# Patient Record
Sex: FEMALE | Race: BLACK OR AFRICAN AMERICAN | NOT HISPANIC OR LATINO | URBAN - METROPOLITAN AREA
[De-identification: names, ages, dates, MRNs, and addresses within clinical notes are randomized per-mention and may not be internally consistent; named-entity substitution may affect disease eponyms.]

---

## 2017-01-28 ENCOUNTER — TRANSFERRED RECORDS (OUTPATIENT)
Dept: HEALTH INFORMATION MANAGEMENT | Facility: CLINIC | Age: 22
End: 2017-01-28

## 2017-11-12 ENCOUNTER — HEALTH MAINTENANCE LETTER (OUTPATIENT)
Age: 22
End: 2017-11-12

## 2018-05-28 ENCOUNTER — HEALTH MAINTENANCE LETTER (OUTPATIENT)
Age: 23
End: 2018-05-28

## 2019-02-20 ENCOUNTER — OFFICE VISIT (OUTPATIENT)
Dept: OPTOMETRY | Facility: CLINIC | Age: 24
End: 2019-02-20
Payer: COMMERCIAL

## 2019-02-20 ENCOUNTER — APPOINTMENT (OUTPATIENT)
Dept: OPTOMETRY | Facility: CLINIC | Age: 24
End: 2019-02-20
Payer: COMMERCIAL

## 2019-02-20 DIAGNOSIS — H52.13 MYOPIA OF BOTH EYES: ICD-10-CM

## 2019-02-20 DIAGNOSIS — Z01.00 ENCOUNTER FOR EXAMINATION OF EYES AND VISION WITHOUT ABNORMAL FINDINGS: Primary | ICD-10-CM

## 2019-02-20 PROCEDURE — 92015 DETERMINE REFRACTIVE STATE: CPT | Performed by: OPTOMETRIST

## 2019-02-20 PROCEDURE — 92004 COMPRE OPH EXAM NEW PT 1/>: CPT | Performed by: OPTOMETRIST

## 2019-02-20 ASSESSMENT — SLIT LAMP EXAM - LIDS
COMMENTS: NORMAL
COMMENTS: NORMAL

## 2019-02-20 ASSESSMENT — REFRACTION_MANIFEST
OD_CYLINDER: SPHERE
OS_SPHERE: -1.50
OD_SPHERE: -1.50
OS_CYLINDER: SPHERE

## 2019-02-20 ASSESSMENT — EXTERNAL EXAM - RIGHT EYE: OD_EXAM: NORMAL

## 2019-02-20 ASSESSMENT — REFRACTION_WEARINGRX
OS_CYLINDER: SPHERE
OS_SPHERE: -1.25
OD_CYLINDER: SPHERE
OD_SPHERE: -1.50

## 2019-02-20 ASSESSMENT — VISUAL ACUITY
OS_SC: 20/20 -1
OD_SC: 20/150
OS_SC: 20/125
METHOD: SNELLEN - LINEAR
OD_SC: 20/20 -1

## 2019-02-20 ASSESSMENT — CUP TO DISC RATIO
OS_RATIO: 0.15
OD_RATIO: 0.2

## 2019-02-20 ASSESSMENT — TONOMETRY
OS_IOP_MMHG: 16
IOP_METHOD: APPLANATION
OD_IOP_MMHG: 15

## 2019-02-20 ASSESSMENT — CONF VISUAL FIELD
OD_NORMAL: 1
OS_NORMAL: 1

## 2019-02-20 ASSESSMENT — EXTERNAL EXAM - LEFT EYE: OS_EXAM: NORMAL

## 2019-02-20 NOTE — PROGRESS NOTES
Chief Complaint   Patient presents with     Annual Eye Exam      Accompanied by self  Last Eye Exam: 4 years ago  Dilated Previously: Yes    What are you currently using to see?  Glasses-broke       Distance Vision Acuity: Noticed gradual change in both eyes    Near Vision Acuity: Satisfied with vision while reading  unaided    Eye Comfort: good  Do you use eye drops? : No  Occupation or Hobbies:     Violet Hendricks, Optometric Tech          Medical, surgical and family histories reviewed and updated 2/20/2019.       OBJECTIVE: See Ophthalmology exam    ASSESSMENT:    ICD-10-CM    1. Encounter for examination of eyes and vision without abnormal findings Z01.00    2. Myopia of both eyes H52.13       PLAN:     Patient Instructions   Joleen was advised of today's exam findings.  Fill glasses prescription  Allow 2 weeks to adapt to change in glasses  Wear glasses full time  Copy of glasses Rx provided today.  Return in 1-2 years for eye exam, or sooner if needed.    The affects of the dilating drops last for 4- 6 hours.  You will be more sensitive to light and vision will be blurry up close.  Mydriatic sunglasses were given if needed.      Victor Manuel Birmingham O.D.  [address]. [facility] [place]  [address]    [phone]

## 2019-02-20 NOTE — PATIENT INSTRUCTIONS
Joleen was advised of today's exam findings.  Fill glasses prescription  Allow 2 weeks to adapt to change in glasses  Wear glasses full time  Copy of glasses Rx provided today.  Return in 1-2 years for eye exam, or sooner if needed.    The affects of the dilating drops last for 4- 6 hours.  You will be more sensitive to light and vision will be blurry up close.  Mydriatic sunglasses were given if needed.      Victor Manuel Birmingham O.D.  [facility] [place]  [address] [place] [address]. [place]  [place]  [address]    [phone]

## 2019-02-20 NOTE — LETTER
2/20/2019         RE: Joleen Morris  [address]  [place] [address]        Dear Colleague,    Thank you for referring your patient, Joleen Morris, to the [facility]. Please see a copy of my visit note below.    Chief Complaint   Patient presents with     Annual Eye Exam      Accompanied by self  Last Eye Exam: 4 years ago  Dilated Previously: Yes    What are you currently using to see?  Glasses-broke       Distance Vision Acuity: Noticed gradual change in both eyes    Near Vision Acuity: Satisfied with vision while reading  unaided    Eye Comfort: good  Do you use eye drops? : No  Occupation or Hobbies:     Violet Hendricks, Optometric Tech          Medical, surgical and family histories reviewed and updated 2/20/2019.       OBJECTIVE: See Ophthalmology exam    ASSESSMENT:    ICD-10-CM    1. Encounter for examination of eyes and vision without abnormal findings Z01.00    2. Myopia of both eyes H52.13       PLAN:     Patient Instructions   Joleen was advised of today's exam findings.  Fill glasses prescription  Allow 2 weeks to adapt to change in glasses  Wear glasses full time  Copy of glasses Rx provided today.  Return in 1-2 years for eye exam, or sooner if needed.    The affects of the dilating drops last for 4- 6 hours.  You will be more sensitive to light and vision will be blurry up close.  Mydriatic sunglasses were given if needed.      Victor Manuel Birmingham O.D.  [address]  [place]  [address]    [phone]           Again, thank you for allowing me to participate in the care of your patient.        Sincerely,        Victor Manuel Birmingham OD

## 2019-02-26 ENCOUNTER — APPOINTMENT (OUTPATIENT)
Dept: OPTOMETRY | Facility: CLINIC | Age: 24
End: 2019-02-26
Payer: COMMERCIAL

## 2019-02-26 PROCEDURE — 92340 FIT SPECTACLES MONOFOCAL: CPT | Performed by: OPTOMETRIST

## 2023-12-05 ENCOUNTER — TELEPHONE (OUTPATIENT)
Dept: FAMILY MEDICINE | Facility: CLINIC | Age: 28
End: 2023-12-05

## 2023-12-05 ENCOUNTER — OFFICE VISIT (OUTPATIENT)
Dept: FAMILY MEDICINE | Facility: CLINIC | Age: 28
End: 2023-12-05
Payer: COMMERCIAL

## 2023-12-05 VITALS
SYSTOLIC BLOOD PRESSURE: 136 MMHG | HEART RATE: 96 BPM | OXYGEN SATURATION: 98 % | BODY MASS INDEX: 43.4 KG/M2 | WEIGHT: 293 LBS | RESPIRATION RATE: 20 BRPM | TEMPERATURE: 98.9 F | HEIGHT: 69 IN | DIASTOLIC BLOOD PRESSURE: 81 MMHG

## 2023-12-05 DIAGNOSIS — Z59.00 HOMELESSNESS: ICD-10-CM

## 2023-12-05 DIAGNOSIS — Z13.9 ENCOUNTER FOR SCREENING INVOLVING SOCIAL DETERMINANTS OF HEALTH (SDOH): ICD-10-CM

## 2023-12-05 DIAGNOSIS — F33.1 MODERATE EPISODE OF RECURRENT MAJOR DEPRESSIVE DISORDER (H): ICD-10-CM

## 2023-12-05 DIAGNOSIS — F41.1 GAD (GENERALIZED ANXIETY DISORDER): ICD-10-CM

## 2023-12-05 PROCEDURE — 99204 OFFICE O/P NEW MOD 45 MIN: CPT | Performed by: NURSE PRACTITIONER

## 2023-12-05 PROCEDURE — 36415 COLL VENOUS BLD VENIPUNCTURE: CPT | Performed by: NURSE PRACTITIONER

## 2023-12-05 PROCEDURE — 84443 ASSAY THYROID STIM HORMONE: CPT | Performed by: NURSE PRACTITIONER

## 2023-12-05 PROCEDURE — 80053 COMPREHEN METABOLIC PANEL: CPT | Performed by: NURSE PRACTITIONER

## 2023-12-05 RX ORDER — OXYCODONE HYDROCHLORIDE 5 MG/1
5 TABLET ORAL
COMMUNITY
Start: 2023-12-05 | End: 2023-12-26

## 2023-12-05 RX ORDER — HYDROXYZINE HYDROCHLORIDE 10 MG/1
10 TABLET, FILM COATED ORAL 3 TIMES DAILY PRN
Qty: 90 TABLET | Refills: 0 | Status: SHIPPED | OUTPATIENT
Start: 2023-12-05

## 2023-12-05 RX ORDER — DOXYCYCLINE 100 MG/1
100 CAPSULE ORAL
COMMUNITY
Start: 2023-12-05 | End: 2023-12-12

## 2023-12-05 RX ORDER — FLUOXETINE 10 MG/1
10 CAPSULE ORAL DAILY
Qty: 90 CAPSULE | Refills: 0 | Status: SHIPPED | OUTPATIENT
Start: 2023-12-05 | End: 2023-12-26

## 2023-12-05 RX ORDER — ACETAMINOPHEN 500 MG
1000 TABLET ORAL EVERY 6 HOURS
COMMUNITY
Start: 2023-12-05 | End: 2023-12-26

## 2023-12-05 RX ORDER — IBUPROFEN 600 MG/1
600 TABLET, FILM COATED ORAL
COMMUNITY
Start: 2023-11-01 | End: 2023-12-26

## 2023-12-05 SDOH — ECONOMIC STABILITY - HOUSING INSECURITY: HOMELESSNESS UNSPECIFIED: Z59.00

## 2023-12-05 ASSESSMENT — ANXIETY QUESTIONNAIRES
6. BECOMING EASILY ANNOYED OR IRRITABLE: NEARLY EVERY DAY
GAD7 TOTAL SCORE: 16
5. BEING SO RESTLESS THAT IT IS HARD TO SIT STILL: SEVERAL DAYS
1. FEELING NERVOUS, ANXIOUS, OR ON EDGE: NEARLY EVERY DAY
2. NOT BEING ABLE TO STOP OR CONTROL WORRYING: NEARLY EVERY DAY
3. WORRYING TOO MUCH ABOUT DIFFERENT THINGS: NEARLY EVERY DAY
7. FEELING AFRAID AS IF SOMETHING AWFUL MIGHT HAPPEN: SEVERAL DAYS
GAD7 TOTAL SCORE: 16
IF YOU CHECKED OFF ANY PROBLEMS ON THIS QUESTIONNAIRE, HOW DIFFICULT HAVE THESE PROBLEMS MADE IT FOR YOU TO DO YOUR WORK, TAKE CARE OF THINGS AT HOME, OR GET ALONG WITH OTHER PEOPLE: VERY DIFFICULT

## 2023-12-05 ASSESSMENT — PATIENT HEALTH QUESTIONNAIRE - PHQ9
SUM OF ALL RESPONSES TO PHQ QUESTIONS 1-9: 22
5. POOR APPETITE OR OVEREATING: MORE THAN HALF THE DAYS
10. IF YOU CHECKED OFF ANY PROBLEMS, HOW DIFFICULT HAVE THESE PROBLEMS MADE IT FOR YOU TO DO YOUR WORK, TAKE CARE OF THINGS AT HOME, OR GET ALONG WITH OTHER PEOPLE: VERY DIFFICULT
SUM OF ALL RESPONSES TO PHQ QUESTIONS 1-9: 22

## 2023-12-05 NOTE — TELEPHONE ENCOUNTER
Patient gave writer form for provider to complete. Patient states that we can fax it out once it is completed.

## 2023-12-05 NOTE — COMMUNITY RESOURCES LIST (ENGLISH)
12/05/2023   [facility] - Outpatient Clinics  N/A  For additional resource needs, please contact your health insurance member services or your primary care team.  Phone: [phone]   Email: N/A   Address: [facility] [place] [address]   Hours: N/A        Financial Stability       Utility payment assistance  1  [place] [facility] - Energy and Utilities Distance: 3.53 miles      In-Person, Phone/Virtual   [address] [place] [address]  Language: English  Hours: Mon - Fri 8:30 AM - 4:30 PM  Fees: Free   Phone: (726) 640-8842 Website: https://[place].[facility]/RF nano/energy/consumer-assistance/energy-assistance-program/     2  [place] Public Prizm Payment Services Commission - [place]'s Telephone Assistance Plan (TAP) and Federal Lifeline and Affordable Connectivity Program (ACP) Distance: 9.88 miles      Phone/Virtual   [address] [place] [address]  Language: English  Fees: Free   Phone: (480) 420-4444 Email: consumer.puc@[facility].mn. Website: https://[place].gov/puc/consumers/telephone/          Food and Nutrition       Food pantry  3  [facility] Distance: 0.52 miles      Pickup   [address] [facility] [place] [address]  Language: English, Mauritian, Vincentian  Hours: Mon - Fri 12:00 PM - 1:00 PM  Fees: Free   Phone: (545) 927-5497 Email: info@[facility].org Website: https://www.[facility].org/locations/[facility]_[place]_ymca     4  Rona Oneal of Peace - Emergency Food Shelf Distance: 0.53 miles      Pickup   [address] [place] [address]  Language: English, Vincentian  Hours: Mon 9:30 AM - 11:30 AM Appt. Only  Fees: Free   Phone: (281) 989-6367 Email: rona@Oriental-Creations.org Website: http://www.Oriental-Creations.org/     SNAP application assistance  5  [employer] [place] Distance: 2.95 miles      Phone/Virtual   [address] [place] [address]  Language: English, Hmong, Scottish, Mauritian, Vincentian  Hours: Mon - Fri 8:30 AM - 4:30 PM   Fees: Free   Phone: (582) 486-2649 Email: helpline@hungersolutions.org Website: https://www.hungersolutions.org/programs/[place]-food-helpline/     6  Community Action Partnership ([facility] Distance: 0.56 miles      Phone/Virtual   [address] [place] [address]  Language: English  Hours: Mon - Fri 8:00 AM - 4:30 PM  Fees: Free   Phone: (550) 826-3495 Email: info@caprw.org Website: http://www.caprw.org/     Soup kitchen or free meals  7  [employer] [place] Distance: 0.27 miles      Pickup   [address] [place] [address]  Language: English  Hours: Mon 12:00 PM - 2:00 PM , Wed 12:00 PM - 2:00 PM  Fees: Free   Phone: (564) 303-4847 Ext.4 Email: info@International Battery.Smallaa Website: http://www.International Battery.Smallaa     8  [facility] [place] - [facility] - Free Summer Meals Distance: 0.54 miles      In-Person   [address] [place] [address]  Language: English  Hours: Mon - Fri 12:30 PM - 1:30 PM , Mon - Fri 4:00 PM - 5:00 PM  Fees: Free   Phone: (751) 371-7439 Email: gabe@.hospitals.mn. Website: https://www.hospitals.[facility]/facilities/riubnuu-zborgzpdbp-exdbnw          Hotlines and Helplines       Hotline - Housing crisis  9  [facility] Distance: 4.78 miles      Phone/Virtual   [address] [place] [address]  Language: English  Hours: Mon - Sun Open 24 Hours   Phone: (751) 945-4477 Email: communications@oscs-mn.org Website: https://oscs-[place].org/oursaviourshousing/     10  [facility] Distance: 6.37 miles      Phone/Virtual   [address] [place] [address]  Language: English  Hours: Mon - Sun Open 24 Hours   Phone: (119) 718-8992 Email: info@[facility].org Website: http://www.Act[facility].org          Housing       Coordinated Entry access point  11  [facility] - Coordinated Access to Housing and Shelter (CAHS) - Coordinated Access - Coordinated Entry access point Distance: 0.56  miles      In-Person, Phone/Virtual   [address] [place] [address]  Language: English  Hours: Mon - Fri 8:00 AM - 4:30 PM  Fees: Free   Phone: (497) 240-3383 Website: https://www.[facility]./residents/assistance-support/assistance/housing-services-support     12  [facility] Distance: 3.15 miles      In-Person, Phone/Virtual   [address] [place] [address]  Language: English  Hours: Mon - Fri 8:30 AM - 4:30 PM  Fees: Free   Phone: (355) 982-3904 Email: info@[facility] Borg[facility] HospitalMediasurface Website: https://www.[facility]ension Borgess HospitalPathfinder Technologiesorg/locations/[facility]/     Drop-in center or day shelter  13  [facility] Distance: 4.4 miles      In-Person   [address] [place] [address]  Language: English  Hours: Mon - Fri 9:00 AM - 4:00 PM  Fees: Free   Phone: (555) 428-8367 Email: frontjenniferCloudTags@Neocis Website: http://Neocis     14  [facility] Center Distance: 4.85 miles      In-Person   [address] [place] [address]  Language: English, Solomon Islander, South African  Hours: Mon - Sat 7:00 AM - 3:00 PM  Fees: Free, Self Pay   Phone: (538) 988-5568 Email: info@OwnLocal Website: https://www.OwnLocal/locations/opportunity-center/     Housing search assistance  15  [employer] Online - https://New Futuro/ Distance: 5.12 miles      Phone/Virtual   [address] [place] [address]  Language: English  Hours: Mon - Sun Open 24 Hours   Email: info@Greendizer Website: https://Polatis.Group-IB     16  VinopolisLink - Online housing search assistance Distance: 6.27 miles      Phone/Virtual   [address] [address]  Language: English, Hmong, Solomon Islander, South African  Hours: Mon - Sun Open 24 Hours   Phone: (462) 855-2249 Email: info@Aridhia Informatics.org Website: http://www.Mobiolink.org/     Shelter for families  17  [facility] Distance:  16.18 miles      In-Person   [address] [place] [address]  Language: English  Hours: Mon - Fri 3:00 PM - 9:00 AM , Sat - Sun Open 24 Hours  Fees: Free   Phone: (747) 167-6131 Ext.1 Website: https://www.saintandrews.Globili/2020/07/03/emergency-family-shelter/     Shelter for individuals  18  [facility] - Coordinated Access to Housing and Shelter (CAHS) - Coordinated Access - Emergency housing Distance: 0.56 miles      In-Person, Phone/Virtual   [address] [place] [address]  Language: English  Hours: Mon - Fri 8:00 AM - 4:30 PM  Fees: Free   Phone: (548) 382-8677 Website: https://www.[facility]./residents/assistance-support/assistance/housing-services-support     19  [facility] [place] Shelter - [facility] [place] Shelter Distance: 3.1 miles      In-Person   [address] [place] [address]  Language: English  Hours: Mon - Sun 5:00 PM - 10:00 AM  Fees: Free, Self Pay   Phone: (625) 791-6859 Email: info@Kleo Website: https://www.byUs.org/locations/[facility]-saint-paul/          Transportation       Free or low-cost transportation  20  [facility] Distance: 1.66 miles      In-Person   [address] [place] [address]  Language: English, Portuguese  Hours: Mon 9:00 AM - 5:00 PM , Tue 9:30 AM - 7:00 PM , Wed 9:00 AM - 5:00 PM , Thu 9:30 AM - 7:00 PM , Fri 9:00 AM - 5:00 PM  Fees: Free   Phone: (323) 886-8298 Email: odilon@wooju Website: http://www.wooju     21  [facility] Distance: 4.84 miles      In-Person   [address] [place] [address]  Language: English  Hours: Mon - Fri 8:00 AM - 3:00 PM  Fees: Free   Phone: (923) 968-8516 Ext.14 Email: cherelle@[facility] Website: http://www.[facility].org     Transportation to medical appointments  22  [facility] - Non-Emergency Medical Transportation  Distance: 2.99 miles      In-Person   [address] [place] [address]  Language: English  Hours: Mon - Fri 8:00 AM - 4:00 PM Appt. Only  Fees: Self Pay   Phone: (458) 110-4050 Website: http://www.allAtmocean.org/Medical-Services/Emergency-medical-services/Non-emergency-transportation/     23  Miguelina Ride Distance: 4.7 miles      In-Person   [address] [address]  Language: English  Hours: Mon - Thu 6:00 AM - 6:00 PM , Fri 6:00 AM - 5:00 PM  Fees: Insurance, Self Pay   Phone: (856) 795-6351 Email: office@Fortscale Website: https://www.Fortscale/          Important Numbers & Websites       [address].org  Poison Control   (879) 553-7060 [place]poison.org  Suicide and Crisis Lifeline   [address] [place], [place] [address]line.org  Childhelp [place] Child Abuse Hotline   760.903.5877 Childhelphotline.org  [place] Sexual Assault Hotline   (805) 245-6929 (HOPE) [facility]  National Runaway Safeline   (578) 281-6211 (RUNAWAY) [facility] Hospital[facility]away.org  Pregnancy & Postpartum Support [place]   Call/text 936-131-3560 Ppsupportmn.org  Substance Abuse National Helpline ([facility]   246-737-HELP (8101) Findtreatment.gov  Emergency Services   911

## 2023-12-05 NOTE — COMMUNITY RESOURCES LIST (ENGLISH)
12/05/2023   [facility]  N/A  For questions about this resource list or additional care needs, please contact your primary care clinic or care manager.  Phone: [phone]   Email: N/A   Address: [address] [place] [address]   Hours: N/A        Financial Stability       Utility payment assistance  1  Community Action Partnership (CAP) [facility] - Energy Assistance Distance: 0.56 miles      Phone/Virtual   [address] [place] [address]  Language: English, Hmong, Paraguayan, Slovenian  Hours: Mon - Fri 8:00 AM - 4:30 PM  Fees: Free   Phone: (560) 247-2057 Email: info@caprw.org Website: http://www.capr.org/     2  [facility] Distance: 2.93 miles      Phone/Virtual   [address] [place] [address]  Language: English, Hmong, Jordanian, Slovenian  Hours: Mon - Fri 10:00 AM - 3:00 PM  Fees: Free   Phone: (405) 580-2259 Email: Hailey@[facility].org Website: http://[facility].org/community/[address]/          Food and Nutrition       Food pantry  3  [facility] Distance: 0.52 miles      Pickup   [address] [place] [address]  Language: English, Paraguayan, Slovenian  Hours: Mon - Fri 12:00 PM - 1:00 PM  Fees: Free   Phone: (585) 472-9290 Email: info@Cyclone Power Technologies.org Website: https://www.[facility].org/locations/_Bonita_Demorest_ymca     4  Rona Mcdowellers of Peace - Emergency Food Shelf Distance: 0.53 miles      Pickup   [address] [place] [address]  Language: English, Slovenian  Hours: Mon 9:30 AM - 11:30 AM Appt. Only  Fees: Free   Phone: (467) 115-9444 Email: rona@Combined Power.org Website: http://www.Combined Power.org/     SNAP application assistance  5  Community Action Partnership (CAP)  [facility] Distance: 0.56 miles      Phone/Virtual   [address] [facility] [address] [place] [address]  Language:  English  Hours: Mon - Fri 8:00 AM - 4:30 PM  Fees: Free   Phone: (822) 605-8324 Email: info@Returbo.org Website: http://www.Returbo.org/     6  [facility] Extension - SNAP Ed - SNAP Ed - SNAP application assistance Distance: 2.09 miles      In-Person   [address] [place] [address]  Language: English, Hmong, Oromo, Monegasque, Thai  Hours: Mon - Fri 9:00 AM - 5:00 PM Appt. Only  Fees: Free   Phone: (820) 963-4488 Email: jamir@[facility] Website: https://extension.[facility]/nutrition-education/snap-ed-educational-offerings     Soup kitchen or free meals  7  [employer] Hands [place] Distance: 0.27 miles      Pickup   [address] [place] [address]  Language: English  Hours: Mon 12:00 PM - 2:00 PM , Wed 12:00 PM - 2:00 PM  Fees: Free   Phone: (756) 359-9209 Ext.4 Email: info@THE ICONIC.Ruby Groupe Website: http://www.THE ICONIC.Ruby Groupe     8  [facility] [place] - General acute hospital - Free Summer Meals Distance: 0.54 miles      In-Person   [address] [place] [address]  Language: English  Hours: Mon - Fri 12:30 PM - 1:30 PM , Mon - Fri 4:00 PM - 5:00 PM  Fees: Free   Phone: (869) 168-1928 Email: gabe@.hospitals.mn. Website: https://www.hospitals.[facility]/facilities/jcnifkk-qofjvytrmn-vmgxie          Hotlines and Helplines       Hotline - Housing crisis  9  [employer] Housing Distance: 4.78 miles      Phone/Virtual   [address] [place] [address]  Language: English  Hours: Mon - Sun Open 24 Hours   Phone: (986) 346-9661 Email: communications@oscs-mn.org Website: https://oscs-mn.org/oursaviourshousing/     10  [facility] Distance: 6.37 miles      Phone/Virtual   [address] [place] [address]  Language: English  Hours: Mon - Sun Open 24 Hours   Phone: (109) 727-5951 Email: info@[facility]avenna Solutions[facility].Ruby Groupe Website: http://www.Farallon BiosciencesPar[facility].org          Housing       Coordinated Entry access point  11  [facility] - Coordinated Access to  Housing and Shelter (CAHS) - Coordinated Access - Coordinated Entry access point Distance: 0.56 miles      In-Person, Phone/Virtual   [address] [place] [address]  Language: English  Hours: Mon - Fri 8:00 AM - 4:30 PM  Fees: Free   Phone: (323) 109-1739 Website: https://www.LightSand CommunicationsMcAlester Regional Health Center – McAlesterBeat My Waste Quote./residents/assistance-support/assistance/housing-services-support     12  [facility] [place] Distance: 3.15 miles      In-Person, Phone/Virtual   [address] [place] [address]  Language: English  Hours: Mon - Fri 8:30 AM - 4:30 PM  Fees: Free   Phone: (936) 315-7204 Email: info@Phosphagenics Website: https://www.Netminingorg/locations/[facility]/     Drop-in center or day shelter  13  [facility] Distance: 4.4 miles      In-Person   [address] [place] [address]  Language: English  Hours: Mon - Fri 9:00 AM - 4:00 PM  Fees: Free   Phone: (123) 494-1045 Email: shantell@VMware Website: http://VMware     14  [facility] and [place] [facility] Distance: 4.85 miles      In-Person   [address] [place] [address]  Language: English, Kenyan, Kinyarwanda  Hours: Mon - Sat 7:00 AM - 3:00 PM  Fees: Free, Self Pay   Phone: (846) 760-4686 Email: info@Blyk.Nightpro Website: https://www.Blyk.org/locations/opportunity-center/     Housing search assistance  15  [facility] - Mental Health Crisis Housing Search Assistance Distance: 0.79 miles      In-Person, Phone/Virtual   [address] [place] [address]  Language: English  Hours: Mon - Tue 8:00 AM - 4:30 PM , Wed 8:00 AM - 6:00 PM , Thu - Fri 8:00 AM - 4:30 PM  Fees: Free   Phone: (757) 357-7621 Email: [facility] Campusc@co.[facility]. Website: https://www.[facility]./residents/health-medical/clinics-services/mental-health/adult-mental-health     16  [facility] - Online Network Search  Assistance Distance: 3.26 miles      Phone/Virtual   [address] [place] [address]  Language: English  Hours: Mon - Sun Open 24 Hours  Fees: Free   Phone: (430) 626-3762 Email: sorayanainaenrico@Burbio.com Website: https://Burbio.com/     Shelter for families  17  [facility] Distance: 16.18 miles      In-Person   [address] [facility] [place] [address]  Language: English  Hours: Mon - Fri 3:00 PM - 9:00 AM , Sat - Sun Open 24 Hours  Fees: Free   Phone: (877) 248-7327 Ext.1 Website: https://www.saintandrews.Microbank Software/2020/07/03/emergency-family-shelter/     Shelter for individuals  18  [facility] - Coordinated Access to Housing and Shelter ([facility]) - Coordinated Access - Emergency housing Distance: 0.56 miles      In-Person, Phone/Virtual   [address] [place] [address]  Language: English  Hours: Mon - Fri 8:00 AM - 4:30 PM  Fees: Free   Phone: (282) 520-1561 Website: https://www.[facility]./residents/assistance-support/assistance/housing-services-support     19  [facility] [place] Shelter - [facility] [place] Shelter Distance: 3.1 miles      In-Person   [address] [place] [address]  Language: English  Hours: Mon - Sun 5:00 PM - 10:00 AM  Fees: Free, Self Pay   Phone: (119) 803-6630 Email: info@[facility] Medical.Microbank Software Website: https://www.Photosonix Medical.org/locations/[facility]-saint-paul/          Transportation       Free or low-cost transportation  20  [employer] Distance: 1.66 miles      In-Person   [address] [place] [address]  Language: English, Turks and Caicos Islander  Hours: Mon 9:00 AM - 5:00 PM , Tue 9:30 AM - 7:00 PM , Wed 9:00 AM - 5:00 PM , Thu 9:30 AM - 7:00 PM , Fri 9:00 AM - 5:00 PM  Fees: Free   Phone: (528) 841-1279 Email: contactus@SynGen.org Website: http://www.SynGen.org     21  [facility] Distance: 4.84 miles      In-Person   [address] [place] [address]  Language: English  Hours: Mon - Fri 8:00 AM - 3:00 PM  Fees: Free   Phone: (946) 611-8754 Ext.14 Email: cherelle@[facility] Website: http://www.SiteBrandMercy Hospital BakersfieldSeeoKettering Health Greene MemorialJaguar Animal Health     Transportation to medical appointments  22  [facility] Transportation - Non-Emergency Medical Transportation Distance: 2.99 miles      In-Person   [address] [place] [address]  Language: English  Hours: Mon - Fri 8:00 AM - 4:00 PM Appt. Only  Fees: Self Pay   Phone: (991) 221-2248 Website: http://www.Ioxus.org/Medical-Services/Emergency-medical-services/Non-emergency-transportation/     23  [facility] Ride Distance: 4.7 miles      In-Person   [address] [address]  Language: English  Hours: Mon - Thu 6:00 AM - 6:00 PM , Fri 6:00 AM - 5:00 PM  Fees: Insurance, Self Pay   Phone: (657) 312-5736 Email: office@Radar Networks Website: https://www.Radar Networks/          Important Numbers & Websites       Emergency Services   911  City Services   311  Poison Control   (552) 331-5235  Suicide Prevention Lifeline   (164) 561-7482 (TALK)  Child Abuse Hotline   (391) 127-1434 (4-A-Child)  Sexual Assault Hotline   (684) 218-4724 (HOPE)  National Runaway Safeline   (539) 883-8220 (RUNAWAY)  All-Options Talkline   (509) 439-8916  Substance Abuse Referral   (913) 754-3016 (HELP)
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
## 2023-12-05 NOTE — PROGRESS NOTES
"  Assessment & Plan     Moderate episode of recurrent major depressive disorder (H)  - Adult Mental Health  Referral  - FLUoxetine (PROZAC) 10 MG capsule  Dispense: 90 capsule; Refill: 0  - TSH with free T4 reflex  - Comprehensive metabolic panel (BMP + Alb, Alk Phos, ALT, AST, Total. Bili, TP)  - TSH with free T4 reflex  - Comprehensive metabolic panel (BMP + Alb, Alk Phos, ALT, AST, Total. Bili, TP)  No thoughts or plan to hurt herself or others. Will start prozac 10mg daily, if tolerating well and no side effects, increase dosage to prozac 20mg daily in 2 weeks. Follow up in 2-4 weeks  Common side effects of Prozac and black box warning was reviewed.   Depression action plan was sent via [employer]    SONIA (generalized anxiety disorder)  As above   - hydrOXYzine HCl (ATARAX) 10 MG tablet  Dispense: 90 tablet; Refill: 0    Encounter for screening involving social determinants of health (SDoH)  - Primary Care - Care Coordination Referral    Homelessness  In the process of applying for housing - will fill out form for professional statement of need and fax it when completed.                Nicotine/Tobacco Cessation:  She reports that she has been smoking cigarettes. She has a 5.00 pack-year smoking history. She has never used smokeless tobacco.  Nicotine/Tobacco Cessation Plan:   Information offered: Patient not interested at this time      BMI:   Estimated body mass index is 47.05 kg/m  as calculated from the following:    Height as of this encounter: 1.746 m (5' 8.75\").    Weight as of this encounter: 143.5 kg (316 lb 4.8 oz).   Weight management plan: Discussed healthy diet and exercise guidelines    Depression Screening Follow Up        12/5/2023    12:34 PM   PHQ   PHQ-9 Total Score 22   Q9: Thoughts of better off dead/self-harm past 2 weeks More than half the days   F/U: Thoughts of suicide or self-harm No   F/U: Safety concerns No         12/5/2023    12:34 PM   Last PHQ-9   1.  Little interest or " pleasure in doing things 2   2.  Feeling down, depressed, or hopeless 3   3.  Trouble falling or staying asleep, or sleeping too much 3   4.  Feeling tired or having little energy 2   5.  Poor appetite or overeating 3   6.  Feeling bad about yourself 3   7.  Trouble concentrating 3   8.  Moving slowly or restless 1   Q9: Thoughts of better off dead/self-harm past 2 weeks 2   PHQ-9 Total Score 22   In the past two weeks have you had thoughts of suicide or self harm? No   Do you have concerns about your personal safety or the safety of others? No               Follow Up      Follow Up Actions Taken  Mental Health Referral placed    Discussed the following ways the patient can remain in a safe environment:  be around others -    ARELIS Allison CNP  [facility]    Karly Goodrich is a 28 year old, with past medical history of obesity, depression and anxiety, presenting for the following health issues:  Mental Health    Joleen is a new patient to me.  Joleen presents needing forms to be filled out as she is applying for hosing. Repots underlying depression ongoing for many years but had not received prior medical care related to this condition as she did not address it.   Currently, does not have a permanent home. Staying with sister or sleeping in her car. Works overnight.  Working full time. 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  Has anxiety about working around people and working overnights has been a good strategy to manage anxiety. Work is good.  Work keeps her occupied and motivated  Tried to apply for an apartment, but could not qualify because she was living with parents in the past (dad passed away, and Mom moved on her own) -parents had a bad record and she was living in same address which has affected her record.   Denies drug use. Denies drinking alcohol.  Smokes: 1 pack every 1-3 days. Started smoking since age 19.        12/5/2023     3:21 PM   Additional  "Questions   Roomed by Janet CALDERON CMA   Accompanied by N/A       History of Present Illness       Reason for visit:  Mental health    She eats 0-1 servings of fruits and vegetables daily.She consumes 7 sweetened beverage(s) daily.She exercises with enough effort to increase her heart rate 9 or less minutes per day.  She exercises with enough effort to increase her heart rate 3 or less days per week.   She is not taking prescribed medications regularly due to remembering to take.         Abnormal Mood Symptoms  Onset/Duration: patient states long over due   Description: having episodes at work or driving as well  Depression (if yes, do PHQ-9): YES  Anxiety (if yes, do SONIA-7): YES  Accompanying Signs & Symptoms:  Still participating in activities that you used to enjoy: No  Fatigue: YES  Irritability: YES  Difficulty concentrating: YES  Changes in appetite: N/A  Problems with sleep: YES not getting enough rest or else sleeping a lot  Heart racing/beating fast: YES sometimes that will make me panic  Abnormally elevated, expansive, or irritable mood: YES  Persistently increased activity or energy: No, there are some random bursts of energy though  Thoughts of hurting yourself or others: not of hurting myself but there are times where I think \"I don't want to do this anymore\"  History:  Recent stress or major life event: YES, homeless on and off for a long time  Prior depression or anxiety: yes but was never diagnosed  Family history of depression or anxiety: \"no one really came in for this kind of stuff\" Sister was seen for it but did not continue with treatment  Alcohol/drug use: No  Difficulty sleeping: YES- depends on the day  Precipitating or alleviating factors: None  Therapies tried and outcome: none      12/5/2023    12:34 PM   PHQ   PHQ-9 Total Score 22   Q9: Thoughts of better off dead/self-harm past 2 weeks More than half the days   F/U: Thoughts of suicide or self-harm No   F/U: Safety concerns No         " "12/5/2023     3:26 PM   SONIA-7 SCORE   Total Score 16           Review of Systems   See HPI      Objective    /81 (BP Location: Left arm, Patient Position: Sitting, Cuff Size: Adult Large)   Pulse 96   Temp 98.9  F (37.2  C) (Tympanic)   Resp 20   Ht 1.746 m (5' 8.75\")   Wt 143.5 kg (316 lb 4.8 oz)   LMP 11/21/2023 (Within Days)   SpO2 98%   BMI 47.05 kg/m    Body mass index is 47.05 kg/m .  Physical Exam   GENERAL: healthy, alert and no distress  NECK: no adenopathy, no asymmetry, masses, or scars and thyroid normal to palpation  RESP: lungs clear to auscultation - no rales, rhonchi or wheezes  CV: regular rate and rhythm, normal S1 S2, no S3 or S4, no murmur, click or rub, no peripheral edema and peripheral pulses strong  ABDOMEN: soft, nontender, no hepatosplenomegaly, no masses and bowel sounds normal  MS: no gross musculoskeletal defects noted, no edema                      "

## 2023-12-05 NOTE — LETTER
My Depression Action Plan  Name: Joleen Morris   Date of Birth 1995  Date: 12/6/2023    My doctor: No Ref-Primary, Physician   My clinic: [facility]  [address] [place] [address] [phone]            GREEN    ZONE   Good Control    What it looks like:   Things are going generally well. You have normal ups and downs. You may even feel depressed from time to time, but bad moods usually last less than a day.   What you need to do:  Continue to care for yourself (see self care plan)  Check your depression survival kit and update it as needed  Follow your physician s recommendations including any medication.  Do not stop taking medication unless you consult with your physician first.             YELLOW         ZONE Getting Worse    What it looks like:   Depression is starting to interfere with your life.   It may be hard to get out of bed; you may be starting to isolate yourself from others.  Symptoms of depression are starting to last most all day and this has happened for several days.   You may have suicidal thoughts but they are not constant.   What you need to do:     Call your care team. Your response to treatment will improve if you keep your care team informed of your progress. Yellow periods are signs an adjustment may need to be made.     Continue your self-care.  Just get dressed and ready for the day.  Don't give yourself time to talk yourself out of it.    Talk to someone in your support network.    Open up your Depression Self-Care Plan/Wellness Kit.             RED    ZONE Medical Alert - Get Help    What it looks like:   Depression is seriously interfering with your life.   You may experience these or other symptoms: You can t get out of bed most days, can t work or engage in other necessary activities, you have trouble taking care of basic hygiene, or basic responsibilities, thoughts of suicide or death that will not go away,  self-injurious behavior.     What you need to do:  Call your care team and request a same-day appointment. If they are not available (weekends or after hours) call your local crisis line, emergency room or 911.          Depression Self-Care Plan / Wellness Kit    Many people find that medication and therapy are helpful treatments for managing depression. In addition, making small changes to your everyday life can help to boost your mood and improve your wellbeing. Below are some tips for you to consider. Be sure to talk with your medical provider and/or behavioral health consultant if your symptoms are worsening or not improving.     Sleep   Sleep hygiene  means all of the habits that support good, restful sleep. It includes maintaining a consistent bedtime and wake time, using your bedroom only for sleeping or sex, and keeping the bedroom dark and free of distractions like a computer, smartphone, or television.     Develop a Healthy Routine  Maintain good hygiene. Get out of bed in the morning, make your bed, brush your teeth, take a shower, and get dressed. Don t spend too much time viewing media that makes you feel stressed. Find time to relax each day.    Exercise  Get some form of exercise every day. This will help reduce pain and release endorphins, the  feel good  chemicals in your brain. It can be as simple as just going for a walk or doing some gardening, anything that will get you moving.      Diet  Strive to eat healthy foods, including fruits and vegetables. Drink plenty of water. Avoid excessive sugar, caffeine, alcohol, and other mood-altering substances.     Stay Connected with Others  Stay in touch with friends and family members.    Manage Your Mood  Try deep breathing, massage therapy, biofeedback, or meditation. Take part in fun activities when you can. Try to find something to smile about each day.     Psychotherapy  Be open to working with a therapist if your provider recommends it.      Medication  Be sure to take your medication as prescribed. Most anti-depressants need to be taken every day. It usually takes several weeks for medications to work. Not all medicines work for all people. It is important to follow-up with your provider to make sure you have a treatment plan that is working for you. Do not stop your medication abruptly without first discussing it with your provider.    Crisis Resources   These hotlines are for both adults and children. They and are open 24 hours a day, 7 days a week unless noted otherwise.    National Suicide Prevention Lifeline   988 or 9-112-555-ZRZS (6740)    Crisis Text Line    www.crisistextline.org  Text HOME to 327596 from anywhere in the [place], anytime, about any type of crisis. A live, trained crisis counselor will receive the text and respond quickly.    Poli Lifeline for LGBTQ Youth  A national crisis intervention and suicide lifeline for LGBTQ youth under 25. Provides a safe place to talk without judgement. Call 1-669.182.9859; text START to 093989 or visit www.thetrevorproject.org to talk to a trained counselor.    For [facility] crisis numbers, visit the [facility] website at:  https://[place].gov/dhs/people-we-serve/adults/health-care/mental-health/resources/crisis-contacts.jsp

## 2023-12-06 ENCOUNTER — PATIENT OUTREACH (OUTPATIENT)
Dept: CARE COORDINATION | Facility: CLINIC | Age: 28
End: 2023-12-06
Payer: COMMERCIAL

## 2023-12-06 LAB
ALBUMIN SERPL BCG-MCNC: 3.9 G/DL (ref 3.5–5.2)
ALP SERPL-CCNC: 67 U/L (ref 40–150)
ALT SERPL W P-5'-P-CCNC: 17 U/L (ref 0–50)
ANION GAP SERPL CALCULATED.3IONS-SCNC: 8 MMOL/L (ref 7–15)
AST SERPL W P-5'-P-CCNC: 15 U/L (ref 0–45)
BILIRUB SERPL-MCNC: <0.2 MG/DL
BUN SERPL-MCNC: 10.2 MG/DL (ref 6–20)
CALCIUM SERPL-MCNC: 8.9 MG/DL (ref 8.6–10)
CHLORIDE SERPL-SCNC: 107 MMOL/L (ref 98–107)
CREAT SERPL-MCNC: 0.67 MG/DL (ref 0.51–0.95)
DEPRECATED HCO3 PLAS-SCNC: 26 MMOL/L (ref 22–29)
EGFRCR SERPLBLD CKD-EPI 2021: >90 ML/MIN/1.73M2
GLUCOSE SERPL-MCNC: 123 MG/DL (ref 70–99)
POTASSIUM SERPL-SCNC: 4.5 MMOL/L (ref 3.4–5.3)
PROT SERPL-MCNC: 6.6 G/DL (ref 6.4–8.3)
SODIUM SERPL-SCNC: 141 MMOL/L (ref 135–145)
TSH SERPL DL<=0.005 MIU/L-ACNC: 1.4 UIU/ML (ref 0.3–4.2)

## 2023-12-06 NOTE — PROGRESS NOTES
Clinic Care Coordination Contact  Community Health Worker Initial Outreach    CHW Initial Information Gathering:  Referral Source: PCP  Current living arrangement:: I live alone  Type of residence:: Homeless  Community Resources: None  Supplies Currently Used at Home: None  Equipment Currently Used at Home: none  Informal Support system:: Friends, Family  No PCP office visit in Past Year: No  Transportation means:: Accessible car  CHW Additional Questions  MyChart active?: Yes  Patient sent Social Determinants of Health questionnaire?: Yes    Patient accepts CC: Yes. Patient scheduled for assessment with [facility] [place] on 12/12/23 at 1PM. Patient noted desire to discuss housing resources.     CHW Note:  CHW contacted patient regarding a referral that was placed for CCC. CHW introduced self and role of CCC.  Patient shared she is currently homeless and has been sleeping in her car. Patient would like assistance exploring housing resources at this time.  CHW scheduled patient with [facility] on 12/12/23 at 1PM to discuss housing resources.  Patient was grateful for the call and appreciates the assistance that is being provided.     Order Questions    Question Answer   Reason for Referral: SDOH Concern   Clinical Staff have discussed the Care Coordination Referral with the patient and/or caregiver: Yes         Светлана Lewis  Community Health Worker   [facility] Care Coordination  [facility]   [email or web address]  Office: [phone]

## 2023-12-06 NOTE — TELEPHONE ENCOUNTER
Pt is looking for her form that needs to be faxed asap.    Do you still have it?  Once completed put in outbox and we will fax for you.

## 2023-12-07 NOTE — TELEPHONE ENCOUNTER
Pt is calling as she really needs her form filled out correclty.    Please let us know when completed so we can let pt know.

## 2023-12-07 NOTE — TELEPHONE ENCOUNTER
Pt came back and said that her  went through the forms and highlighted sections that the provider either missed, didn't eboni, or should have marked. Pt was informed that forms could take up to 5-7 business days, pt understands but would appreciate the priority since she is homeless and needs the forms done ASAP.  Paperwork given to Vira to go over with Barbara.

## 2023-12-09 ENCOUNTER — HEALTH MAINTENANCE LETTER (OUTPATIENT)
Age: 28
End: 2023-12-09

## 2023-12-12 ENCOUNTER — PATIENT OUTREACH (OUTPATIENT)
Dept: NURSING | Facility: CLINIC | Age: 28
End: 2023-12-12
Payer: COMMERCIAL

## 2023-12-12 ASSESSMENT — ACTIVITIES OF DAILY LIVING (ADL): DEPENDENT_IADLS:: INDEPENDENT

## 2023-12-12 NOTE — LETTER
[facility]  Patient Centered Plan of Care  About Me:        Patient Name:  Joleen Burden    YOB: 1995  Age:         28 year old   [place] MRN:    [id number] Telephone Information:  Home Phone [phone]   Mobile [phone]   Mobile [phone]   Home Phone Not on file.       Address:  [facility]  [place] [address] Email address:  [email or web address]      Emergency Contact(s)    Name Relationship Lgl Grd Work Phone Home Phone Mobile Phone   1. BRUNA BURDEN Mother No  [phone]    2. PAYTON HUNT Father   [phone]    3. BRUNA BURDEN Mother    [phone]           Primary language:  English     needed? No   [employer]:  [phone] op. 1  Other communication barriers:Glasses    Preferred Method of Communication:  Mail  Current living arrangement: Other    Mobility Status/ Medical Equipment: Independent        Health Maintenance  Health Maintenance Reviewed: Due/Overdue   Health Maintenance Due   Topic Date Due    ADVANCE CARE PLANNING  Never done    HEPATITIS B IMMUNIZATION (1 of 3 - 3-dose series) Never done    COVID-19 Vaccine (1) Never done    Pneumococcal Vaccine: Pediatrics (0 to 5 Years) and At-Risk Patients (6 to 64 Years) (1 - PCV) Never done    HPV IMMUNIZATION (1 - 2-dose series) Never done    HIV SCREENING  Never done    YEARLY PREVENTIVE VISIT  01/10/2013    HEPATITIS C SCREENING  Never done    PAP  Never done    EYE EXAM  02/20/2020    DTAP/TDAP/TD IMMUNIZATION (1 - Tdap) Never done    INFLUENZA VACCINE (1) Never done           My Access Plan  Medical Emergency 911   Primary Clinic Line  [facility]   24 Hour Appointment Line [phone] or  [phone] ([phone]) (toll-free)   24 Hour Nurse Line [phone] (toll-free)   Preferred Urgent Care Other     Preferred Hospital [facility]  [phone]     Preferred Pharmacy [facility]  [place] [facility] [address]     Behavioral Health Crisis Line The National Suicide Prevention Lifeline at 1-435.598.4651 or Text/Call 508           My Care Team Members  Patient Care Team         Relationship Specialty Notifications Start End    No Ref-Primary, Physician PCP - General   11/13/23     Fax: [phone]         Mercy Rosenthal LSW Lead Care Coordinator Primary Care - CC Admissions 12/6/23     Phone: [phone]         Светлана Lewis, W Community Health Worker Primary Care - CC Admissions 12/13/23                 My Care Plans  Self Management and Treatment Plan    Care Plan  Care Plan: Housing Instability       Problem: SDOH LACK OF STABLE HOUSING       Long-Range Goal: Establish Stable Housing       Start Date: 12/12/2023 Expected End Date: 12/11/2024    Priority: High    Note:     Barriers: Past UD and evictions, waiting time for housing support.   Strengths: motivated, employed.   Patient expressed understanding of goal: yes  Action steps to achieve this goal:  1. I will review resources provided by [facility] of housing options.   2. I will contact those that interest me to see if I can put in an application.   3. I will work with housing support program to get help in cleaning up my rental history or other problems that impede my getting housing.                                 Action Plans on File:            Depression          Advance Care Plans/Directives:   Advanced Care Plan/Directives on file:   No    Discussed with patient/caregiver(s):   Declined Further Information             My Medical and Care Information  Problem List   Patient Active Problem List   Diagnosis    Vision disturbance    Morbid obesity (H)    Abnormal glucose    Acanthosis nigricans      Current Medications and Allergies:    Current Outpatient Medications   Medication Instructions    acetaminophen (TYLENOL) 1,000 mg, Oral, EVERY 6 HOURS    FLUoxetine (PROZAC) 10 mg, Oral, DAILY    hydrOXYzine HCl (ATARAX) 10 mg, Oral, 3 TIMES  DAILY PRN    ibuprofen (ADVIL/MOTRIN) 600 mg, Oral    NO ACTIVE MEDICATIONS No dose, route, or frequency recorded.    oxyCODONE (ROXICODONE) 5 mg, Oral         Care Coordination Start Date: 12/5/2023   Frequency of Care Coordination: monthly, more frequently as needed     Form Last Updated: 12/13/2023

## 2023-12-12 NOTE — LETTER
[facility] CARE COORDINATION  [facility]  December 13, 2023    Joleen Morris  [address] [place] [address]      Dear Joleen,    I am a clinic care coordinator who works with Physician Kimberlee RefLuzPrimary with the [facility]. I wanted to thank you for spending the time to talk with me.  Below is a description of clinic care coordination and how I can further assist you.       The clinic care coordination team is made up of a registered nurse, , financial resource worker and community health worker who understand the health care system. The goal of clinic care coordination is to help you manage your health and improve access to the health care system. Our team works alongside your provider to assist you in determining your health and social needs. We can help you obtain health care and community resources, providing you with necessary information and education. We can work with you through any barriers and develop a care plan that helps coordinate and strengthen the communication between you and your care team.  Our services are voluntary and are offered without charge to you personally.    Please feel free to contact me with any questions or concerns regarding care coordination and what we can offer.      We are focused on providing you with the highest-quality healthcare experience possible.    Garrison Goodrich, not sure if this would work for your criminal record clean up, but a law was passed to make if free to do so.  Check out this website for more information on how to do and if you may qualify.    https://www.ag.[facility].[place].us/Office/Expungement.asp      Here is information on how to expunge your eviction from your record- https://www.lawhelpmn.org/self-help-library/fact-sheet/expunging-eviction-case    And here are some housing options to review- contact those you are interested in.     https://www.[facility].org/properties/   We rent to tenants that other landlords  turn down because of their low income or poor rental and criminal histories. Without stable housing these individuals are unable to hold down a job or get their children to school every day. We provide support and flexibility so our tenants can maintain their housing. Many of our tenants are low wage and seasonal workers - essential to our economy but leaving them with insufficient income to afford market rent apartments.    website with lists of affordable apartments in the [address] [place] [address] area:    https://www.[place].us/DocumentCenter/View/46767/Affordable_Housing_70000-103PDF?bidId=    Home - [employer]- housing around and in the  area.     https://www.[facility].org/own-your-own-home  [employer] ([employer]) has apartments and also a program to help you buy a house.      And here is the link with the apartments with openings for [employer]  https://www.pplproperties.org/searchlisting?citystate=    [email or web address]/  Sincerely,     Mercy Rosenthal,   [facility]  [phone]      Enclosed: I have enclosed a copy of the Patient Centered Plan of Care. This has helpful information and goals that we have talked about. Please keep this in an easy to access place to use as needed.

## 2023-12-13 ASSESSMENT — ACTIVITIES OF DAILY LIVING (ADL): DEPENDENT_IADLS:: INDEPENDENT
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
## 2023-12-22 ENCOUNTER — DOCUMENTATION ONLY (OUTPATIENT)
Dept: FAMILY MEDICINE | Facility: CLINIC | Age: 28
End: 2023-12-22

## 2023-12-22 NOTE — PROGRESS NOTES
Joleen Morris has an upcoming lab appointment:    Please place orders in epic     Thank you     Future Appointments   Date Time Provider Department Center   12/22/2023  3:15 PM [facility]   12/26/2023  4:30 PM Amanda Evans, ARELIS CNP [facility]     Patient is scheduled for the following lab(s):     There is no order available. Please review and place either future orders or HMPO (Review of Health Maintenance Protocol Orders), as appropriate.    Health Maintenance Due   Topic    ANNUAL REVIEW OF HM ORDERS     HIV SCREENING     HEPATITIS C SCREENING      Roxana Vang

## 2023-12-22 NOTE — PROGRESS NOTES
Had labs a few weeks ago.  Only due for hiv and hep c population screen -does she want those ordered?    Violet Rucker PA-C

## 2023-12-26 ENCOUNTER — OFFICE VISIT (OUTPATIENT)
Dept: FAMILY MEDICINE | Facility: CLINIC | Age: 28
End: 2023-12-26
Payer: COMMERCIAL

## 2023-12-26 ENCOUNTER — PATIENT OUTREACH (OUTPATIENT)
Dept: ONCOLOGY | Facility: CLINIC | Age: 28
End: 2023-12-26

## 2023-12-26 VITALS
BODY MASS INDEX: 44.41 KG/M2 | SYSTOLIC BLOOD PRESSURE: 115 MMHG | OXYGEN SATURATION: 97 % | HEIGHT: 68 IN | TEMPERATURE: 98.6 F | WEIGHT: 293 LBS | HEART RATE: 95 BPM | DIASTOLIC BLOOD PRESSURE: 77 MMHG

## 2023-12-26 DIAGNOSIS — Z11.59 NEED FOR HEPATITIS C SCREENING TEST: ICD-10-CM

## 2023-12-26 DIAGNOSIS — Z12.4 CERVICAL CANCER SCREENING: ICD-10-CM

## 2023-12-26 DIAGNOSIS — G43.909 MIGRAINE WITHOUT STATUS MIGRAINOSUS, NOT INTRACTABLE, UNSPECIFIED MIGRAINE TYPE: ICD-10-CM

## 2023-12-26 DIAGNOSIS — B96.89 BV (BACTERIAL VAGINOSIS): ICD-10-CM

## 2023-12-26 DIAGNOSIS — Z00.00 ROUTINE GENERAL MEDICAL EXAMINATION AT A HEALTH CARE FACILITY: Primary | ICD-10-CM

## 2023-12-26 DIAGNOSIS — N76.0 BV (BACTERIAL VAGINOSIS): ICD-10-CM

## 2023-12-26 DIAGNOSIS — Z13.9 ENCOUNTER FOR SCREENING INVOLVING SOCIAL DETERMINANTS OF HEALTH (SDOH): ICD-10-CM

## 2023-12-26 DIAGNOSIS — Z11.4 SCREENING FOR HIV (HUMAN IMMUNODEFICIENCY VIRUS): ICD-10-CM

## 2023-12-26 LAB
CLUE CELLS: PRESENT
HBA1C MFR BLD: 6.2 % (ref 0–5.6)
TRICHOMONAS, WET PREP: ABNORMAL
WBC'S/HIGH POWER FIELD, WET PREP: ABNORMAL
YEAST, WET PREP: ABNORMAL

## 2023-12-26 PROCEDURE — G0124 SCREEN C/V THIN LAYER BY MD: HCPCS | Performed by: PATHOLOGY

## 2023-12-26 PROCEDURE — 36415 COLL VENOUS BLD VENIPUNCTURE: CPT

## 2023-12-26 PROCEDURE — G0145 SCR C/V CYTO,THINLAYER,RESCR: HCPCS

## 2023-12-26 PROCEDURE — 80048 BASIC METABOLIC PNL TOTAL CA: CPT

## 2023-12-26 PROCEDURE — 87624 HPV HI-RISK TYP POOLED RSLT: CPT

## 2023-12-26 PROCEDURE — 87591 N.GONORRHOEAE DNA AMP PROB: CPT

## 2023-12-26 PROCEDURE — 86780 TREPONEMA PALLIDUM: CPT

## 2023-12-26 PROCEDURE — 99395 PREV VISIT EST AGE 18-39: CPT

## 2023-12-26 PROCEDURE — 87491 CHLMYD TRACH DNA AMP PROBE: CPT

## 2023-12-26 PROCEDURE — 99213 OFFICE O/P EST LOW 20 MIN: CPT | Mod: 25

## 2023-12-26 PROCEDURE — 87210 SMEAR WET MOUNT SALINE/INK: CPT

## 2023-12-26 PROCEDURE — 86803 HEPATITIS C AB TEST: CPT

## 2023-12-26 PROCEDURE — 83036 HEMOGLOBIN GLYCOSYLATED A1C: CPT

## 2023-12-26 PROCEDURE — 86706 HEP B SURFACE ANTIBODY: CPT

## 2023-12-26 PROCEDURE — 80061 LIPID PANEL: CPT

## 2023-12-26 PROCEDURE — 87389 HIV-1 AG W/HIV-1&-2 AB AG IA: CPT

## 2023-12-26 RX ORDER — METRONIDAZOLE 500 MG/1
500 TABLET ORAL 2 TIMES DAILY
Qty: 14 TABLET | Refills: 0 | Status: SHIPPED | OUTPATIENT
Start: 2023-12-26 | End: 2024-01-02

## 2023-12-26 RX ORDER — SUMATRIPTAN 25 MG/1
25 TABLET, FILM COATED ORAL
Qty: 20 TABLET | Refills: 1 | Status: SHIPPED | OUTPATIENT
Start: 2023-12-26

## 2023-12-26 ASSESSMENT — PATIENT HEALTH QUESTIONNAIRE - PHQ9
SUM OF ALL RESPONSES TO PHQ QUESTIONS 1-9: 17
10. IF YOU CHECKED OFF ANY PROBLEMS, HOW DIFFICULT HAVE THESE PROBLEMS MADE IT FOR YOU TO DO YOUR WORK, TAKE CARE OF THINGS AT HOME, OR GET ALONG WITH OTHER PEOPLE: VERY DIFFICULT
SUM OF ALL RESPONSES TO PHQ QUESTIONS 1-9: 17

## 2023-12-26 ASSESSMENT — ENCOUNTER SYMPTOMS
BREAST MASS: 0
HEADACHES: 1

## 2023-12-26 ASSESSMENT — SOCIAL DETERMINANTS OF HEALTH (SDOH)
WITHIN THE LAST YEAR, HAVE YOU BEEN AFRAID OF YOUR PARTNER OR EX-PARTNER?: YES
WITHIN THE LAST YEAR, HAVE YOU BEEN KICKED, HIT, SLAPPED, OR OTHERWISE PHYSICALLY HURT BY YOUR PARTNER OR EX-PARTNER?: YES
WITHIN THE LAST YEAR, HAVE TO BEEN RAPED OR FORCED TO HAVE ANY KIND OF SEXUAL ACTIVITY BY YOUR PARTNER OR EX-PARTNER?: NO
WITHIN THE LAST YEAR, HAVE YOU BEEN HUMILIATED OR EMOTIONALLY ABUSED IN OTHER WAYS BY YOUR PARTNER OR EX-PARTNER?: YES

## 2023-12-26 NOTE — PATIENT INSTRUCTIONS
[phone]  [email or web address]    Preventive Health Recommendations  Female Ages 26 - 39  Yearly exam:   See your health care provider every year in order to  Review health changes.   Discuss preventive care.    Review your medicines if you your doctor has prescribed any.    Until age 30: Get a Pap test every three years (more often if you have had an abnormal result).    After age 30: Talk to your doctor about whether you should have a Pap test every 3 years or have a Pap test with HPV screening every 5 years.   You do not need a Pap test if your uterus was removed (hysterectomy) and you have not had cancer.  You should be tested each year for STDs (sexually transmitted diseases), if you're at risk.   Talk to your provider about how often to have your cholesterol checked.  If you are at risk for diabetes, you should have a diabetes test (fasting glucose).  Shots: Get a flu shot each year. Get a tetanus shot every 10 years.   Nutrition:   Eat at least 5 servings of fruits and vegetables each day.  Eat whole-grain bread, whole-wheat pasta and brown rice instead of white grains and rice.  Get adequate Calcium and Vitamin D.     Lifestyle  Exercise at least 150 minutes a week (30 minutes a day, 5 days of the week). This will help you control your weight and prevent disease.  Limit alcohol to one drink per day.  No smoking.   Wear sunscreen to prevent skin cancer.  See your dentist every six months for an exam and cleaning.
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
## 2023-12-26 NOTE — PROGRESS NOTES
SUBJECTIVE:   Joleen is a 28 year old, presenting for the following:  Physical, Establish Care, and lab work        12/26/2023     4:04 PM   Additional Questions   Roomed by martha strong       Healthy Habits:     Getting at least 3 servings of Calcium per day:  NO    Bi-annual eye exam:  Yes    Dental care twice a year:  NO    Sleep apnea or symptoms of sleep apnea:  Excessive snoring    Diet:  Regular (no restrictions)    Frequency of exercise:  None    Taking medications regularly:  Yes    Medication side effects:  None and Other    Additional concerns today:  No    Currently lives in her car. Occasionally goes to a friends house. Because of this, often is unable to eat nutritious foods and usually eats mcdonalds or has a bag of chips before work. Works overnights so has been having a difficult time finding housing. Was provided a care coordination referral during previous visit with another provider at the beginning of December. Has applied for [employer] funding and is waiting to hear back.     Today's PHQ-9 Score:       12/26/2023     3:54 PM   PHQ-9 SCORE   PHQ-9 Total Score MyChart 17 (Moderately severe depression)   PHQ-9 Total Score 17     Was prescribed Prozac at the beginning of December but patient stated after reading more about it, is not interested in taking the medication right now. Was prescribed Hydroxazine as needed but has not tried it.     Have you ever done Advance Care Planning? (For example, a Health Directive, POLST, or a discussion with a medical provider or your loved ones about your wishes): No, advance care planning information given to patient to review.  Patient plans to discuss their wishes with loved ones or provider.      Social History     Tobacco Use    Smoking status: Every Day     Packs/day: 1.00     Years: 5.00     Additional pack years: 0.00     Total pack years: 5.00     Types: Cigarettes    Smokeless tobacco: Never    Tobacco comments:     outdoor smoker in household (outdoors)  "  Substance Use Topics    Alcohol use: No           12/26/2023     3:53 PM   Alcohol Use   Prescreen: >3 drinks/day or >7 drinks/week? No     Reviewed orders with patient.  Reviewed health maintenance and updated orders accordingly - Yes      Breast Cancer Screening:    States her mom had breast cancer in her 50's and was going to get genetic counseling at that time did not.       Patient under 40 years of age: Routine Mammogram Screening not recommended.   Pertinent mammograms are reviewed under the imaging tab.    History of abnormal Pap smear: NO - age 21-29 PAP every 3 years recommended     Reviewed and updated as needed this visit by clinical staff   Tobacco  Allergies  Meds  Problems  Med Hx  Surg Hx  Fam Hx          Reviewed and updated as needed this visit by Provider   Tobacco  Allergies  Meds  Problems  Med Hx  Surg Hx  Fam Hx             Review of Systems   Breasts:  Negative for tenderness, breast mass and discharge.   Genitourinary:  Negative for pelvic pain, vaginal bleeding and vaginal discharge.   Neurological:  Positive for headaches.     Headaches feel like both cluster headaches and migraines. There are times when she has to lay in a dark room and they can last a number of days. She is unaware of any auras occurring before headaches. Says she develops headaches behind her eyes. Has tried to think of events around headaches starting but cannot pin point a cause.      OBJECTIVE:   /77 (BP Location: Left arm, Patient Position: Sitting, Cuff Size: Adult Large)   Pulse 95   Temp 98.6  F (37  C) (Temporal)   Ht 1.727 m (5' 8\")   Wt 144 kg (317 lb 6.4 oz)   LMP 12/11/2023 (Within Days)   SpO2 97%   BMI 48.26 kg/m    Physical Exam  GENERAL: healthy, alert and no distress  EYES: Eyes grossly normal to inspection, PERRL and conjunctivae and sclerae normal  HENT: ear canals and TM's normal, nose and mouth without ulcers or lesions  NECK: no adenopathy, no asymmetry, masses, or " scars and thyroid normal to palpation  RESP: lungs clear to auscultation - no rales, rhonchi or wheezes  CV: regular rate and rhythm, normal S1 S2, no S3 or S4, no murmur, click or rub, no peripheral edema   ABDOMEN: soft, nontender, no hepatosplenomegaly, no masses and bowel sounds normal   (female): normal female external genitalia, normal urethral meatus, vaginal mucosa pink, moist, well rugated, and normal cervix, PAP collected.   MS: no gross musculoskeletal defects noted, no edema  SKIN: no suspicious lesions or rashes  NEURO: Normal strength and tone, mentation intact and speech normal  PSYCH: mentation appears normal, affect normal/bright        ASSESSMENT/PLAN:   (Z00.00) Routine general medical examination at a health care facility  (primary encounter diagnosis)  Comment: Care gaps addressed. STI screening today. Genetic counseling referral placed due to maternal history of breast cancer. Instructed patient to contact insurance before making an appointment. Routine labs ordered.   Plan: HIV Antigen Antibody Combo, Hepatitis C Screen         Reflex to HCV RNA Quant and Genotype, Pap         Screen reflex to HPV if ASCUS - recommend age         25 - 29, Adult Oncology/Hematology          Referral, Hemoglobin A1c, Lipid panel reflex to        direct LDL Fasting, Basic metabolic panel  (Ca,        Cl, CO2, Creat, Gluc, K, Na, BUN), Treponema         Abs w Reflex to RPR and Titer, NEISSERIA         GONORRHOEA PCR, CHLAMYDIA TRACHOMATIS PCR, Wet         preparation, Hepatitis B Surface Antibody  Yearly prevent    (Z13.9) Encounter for screening involving social determinants of health (SDoH)  Comment: Care coordination referral placed at the beginning of the month.   Plan: Follow up with care coordination on SDoH.     (Z11.4) Screening for HIV (human immunodeficiency virus)  Plan: HIV Antigen Antibody Combo    (Z11.59) Need for hepatitis C screening test  Plan: Hepatitis C Screen Reflex to HCV RNA  Quant and         Genotype    (Z12.4) Cervical cancer screening  Comment: collected  Plan: Pap Screen reflex to HPV if ASCUS - recommend         age 25 - 29    (G43.909) Migraine without status migrainosus, not intractable, unspecified migraine type  Comment: Appears that she has two different types of headaches both cluster and migraine. Plan for patient to complete headache log to help better identify headaches. Prescribed Sumatriptan for management of treatment of migraines at onset.   Plan: SUMAtriptan (IMITREX) 25 MG tablet    (N76.0,  B96.89) BV (bacterial vaginosis)  Comment: Wet prep positive for clue cells. Metronidazole sent to pharmacy.   Plan: metroNIDAZOLE (FLAGYL) 500 MG tablet        Depression Screening Follow Up        12/26/2023     3:54 PM   PHQ   PHQ-9 Total Score 17   Q9: Thoughts of better off dead/self-harm past 2 weeks Not at all         Follow Up Actions Taken  Care coordination referral in place, plans to see mental health in the beginning of the year, declined restarting Prozac at this time, but encouraged patient to reach out if she does decide she wants to restart medication. States she has numbers and resources to reach out to if needed.       COUNSELING:  Reviewed preventive health counseling, as reflected in patient instructions        She reports that she has been smoking cigarettes. She has a 5 pack-year smoking history. She has never used smokeless tobacco.  Nicotine/Tobacco Cessation Plan:   Information offered: Patient not interested at this time          ARELIS King CNP  [facility] [place]  Answers submitted by the patient for this visit:  Patient Health Questionnaire (Submitted on 12/26/2023)  If you checked off any problems, how difficult have these problems made it for you to do your work, take care of things at home, or get along with other people?: Very difficult  PHQ9 TOTAL SCORE: 17

## 2023-12-26 NOTE — COMMUNITY RESOURCES LIST (ENGLISH)
12/26/2023   [facility]  N/A  For questions about this resource list or additional care needs, please contact your primary care clinic or care manager.  Phone: [phone]   Email: N/A   Address: [address] [place] [address]   Hours: N/A        Financial Stability       Rent and mortgage payment assistance  1  [facility] - Outpatient Addiction Treatment Distance: 0.77 miles      In-Person, Phone/Virtual   [address] [place] [address]  Language: English, Chilean  Hours: Mon - Fri 8:00 AM - 4:30 PM  Fees: Insurance   Phone: (713) 194-1501 Email: roots@L3 Website: https://L3/roots/     2  [facility] [place] [employer] - Section 811 Supportive Housing Project-Based Rental Assistance Program (811 PRA) Distance: 3.42 miles      Phone/Virtual   [address] [facility] [address] [place] [address]  Language: English  Hours: Mon - Fri 8:00 AM - 5:00 PM Appt. Only  Fees: Free   Phone: (903) 139-1416 Email: [place].housing@[facility]. Website: https://www.[facility]"[employer].gov/index.html     Utility payment assistance  3  Community Action Partnership ([facility]) [facility] - Energy Assistance Distance: 0.56 miles      Phone/Virtual   [address] [place] [address]  Language: English, Hmong, Ecuadorean, Chilean  Hours: Mon - Fri 8:00 AM - 4:30 PM  Fees: Free   Phone: (613) 234-8358 Email: info@caprw.org Website: http://www.caprw.org/     4  [facility] and Service [facility] Distance: 2.93 miles      Phone/Virtual   [address] [place] [address]  Language: English, Hmong, Greenlandic, Chilean  Hours: Mon - Fri 10:00 AM - 3:00 PM  Fees: Free   Phone: (524) 517-4887 Email: Hailey@[facility].salvationarmy.org Website: http://salvationarmynorth.org/community/[facility]/          Food and Nutrition       Food pantry  5  [facility] Distance: 0.52 miles      [facility]    [address] [place] [address]  Language: English  Hours: Mon - Fri 12:00 PM - 1:00 PM  Fees: Free   Phone: (561) 544-9927 Email: info@[employer] Website: https://www.Scrip-tFeaturespaceorg/locations/[facility] [place]ald[place]_ymca     6  [employer] - Emergency Food Shelf Distance: 0.53 miles      Pickup   [address] [place] [address]  Language: English, Fijian  Hours: Mon 9:30 AM - 11:30 AM Appt. Only  Fees: Free   Phone: (343) 908-2697 Email: rona@[employer] Website: http://www.MatsSoft/     SNAP application assistance  7  Community Action [facility] ([facility] Distance: 0.56 miles      Phone/Virtual   [address] [address]  Language: English  Hours: Mon - Fri 8:00 AM - 4:30 PM  Fees: Free   Phone: (106) 352-8819 Email: info@[facility].org Website: http://www.caprGertrude.org/     8  [facility] Extension - SNAP Ed - SNAP Ed - SNAP application assistance Distance: 2.09 miles      In-Person   [address] [place] [address]  Language: English, Hmong, Oromo, Albanian, Fijian  Hours: Mon - Fri 9:00 AM - 5:00 PM Appt. Only  Fees: Free   Phone: (533) 815-1828 Email: krystent@[facility] Website: https://extension.[facility]/nutrition-education/snap-ed-educational-offerings     Soup kitchen or free meals  9  [employer] [place] Distance: 0.27 miles      Pickup   [address] [place] [address]  Language: English  Hours: Mon 12:00 PM - 2:00 PM , Wed 12:00 PM - 2:00 PM  Fees: Free   Phone: (868) 849-1465 Ext.4 Email: info@[employer] Website: http://www.AMT ([employer]).Picket     10  [facility] [place] [facility] - Free Summer Meals Distance: 1.04 miles      In-Person   [address] [facility] [place] [address]  Language: English  Hours: Mon - Fri 12:00 PM - 1:00 PM , Mon - Fri 3:00 PM - 4:00 PM  Fees: Free   Phone: (735) 594-1919 Email: roger@[facility].us Website:  https://www.[facility]/departments/ohara-recreation/[facility]          Hotlines and Helplines       Hotline - Housing crisis  11  [facility] Distance: 4.78 miles      Phone/Virtual   [address] [place] [address]  Language: English  Hours: Mon - Sun Open 24 Hours   Phone: (723) 580-5050 Email: communications@[facility].org Website: https://[facility].org/doreenaviourshousing/     12  [facility] Distance: 6.37 miles      Phone/Virtual   [address] [place] [address]  Language: English  Hours: Mon - Sun Open 24 Hours   Phone: (379) 765-8549 Email: info@[facility] Website: http://www.[facility].org          Housing       Coordinated Entry access point  13  [facility] - Coordinated Access to Housing and Shelter (CAHS) - Coordinated Access - Coordinated Entry access point Distance: 0.56 miles      In-Person, Phone/Virtual   [address] [place] [address]  Language: English  Hours: Mon - Fri 8:00 AM - 4:30 PM  Fees: Free   Phone: (364) 611-2333 Website: https://www.[facility]./residents/assistance-support/assistance/housing-services-support     14  [facility] Distance: 3.15 miles      In-Person, Phone/Virtual   [address] [place] [address]  Language: English  Hours: Mon - Fri 8:30 AM - 4:30 PM  Fees: Free   Phone: (403) 981-8449 Email: info@[facility].org Website: https://www.[facility].org/locations/[facility]/     Drop-in center or day shelter  15  [facility] Distance: 4.4 miles      In-Person   [address] [place] [address]  Language: English  Hours: Mon - Fri 9:00 AM - 4:00 PM  Fees: Free   Phone: (608) 104-3070 Email: shantell@[employer].org Website: http://Adcade.org     16  [facility] Distance: 4.85 miles      In-Person   [address] [place] [address]  Language:  English, [place]cair[place] Islander, Occitan  Hours: Mon - Sat 7:00 AM - 3:00 PM  Fees: Free, Self Pay   Phone: (929) 744-9209 Email: info@[employer] Website: https://www.[employer].MyHeritage/locations/opportunity-center/     Housing search assistance  17  [facility] - [facility] - Mental Health Crisis Housing Search Assistance Distance: 0.79 miles      In-Person, Phone/Virtual   [address] [facility] [address] [place] [address]  Language: English  Hours: Mon - Tue 8:00 AM - 4:30 PM , Wed 8:00 AM - 6:00 PM , Thu - Fri 8:00 AM - 4:30 PM  Fees: Free   Phone: (674) 807-8173 Email: [facility]. Website: https://www.GheensPhoenix S&T/residents/health-medical/clinics-services/mental-health/adult-mental-health     18  [facility] - Online Housing Search Assistance Distance: 3.26 miles      Phone/Virtual   [address] [place] [address]  Language: English  Hours: Mon - Sun Open 24 Hours  Fees: Free   Phone: (202) 438-9444 Email: findhouenrico@[employer] Website: https://Spinelab/     Shelter for families  19  [facility] Distance: 16.18 miles      In-Person   [address] [place] [address]  Language: English  Hours: Mon - Fri 3:00 PM - 9:00 AM , Sat - Sun Open 24 Hours  Fees: Free   Phone: (468) 832-3491 Ext.1 Website: https://www.saintandrews.MyHeritage/2020/07/03/emergency-family-shelter/     Shelter for individuals  20  [facility] - Coordinated Access to Housing and Shelter (CAHS) - Coordinated Access - Emergency housing Distance: 0.56 miles      In-Person, Phone/Virtual   [address] [place] [address]  Language: English  Hours: Mon - Fri 8:00 AM - 4:30 PM  Fees: Free   Phone: (165) 714-7509 Website: https://www.GheensPhoenix S&T/residents/assistance-support/assistance/housing-services-support     21  [facility] and [place] - [facility] Ground [place] Shel[facility] - [facility] [place]  [place] [facility] Distance: 3.1 miles      In-Person   [address] [place] [address]  Language: English  Hours: Mon - Sun 5:00 PM - 10:00 AM  Fees: Free, Self Pay   Phone: (814) 675-6039 Email: info@[employer] Website: https://www.[employer].org/locations/higher-ground-[place]/          Transportation       Free or low-cost transportation  22  [employer] Distance: 1.66 miles      In-Person   [address] [place] [address]  Language: English, Dutch  Hours: Mon 9:00 AM - 5:00 PM , Tue 9:30 AM - 7:00 PM , Wed 9:00 AM - 5:00 PM , Thu 9:30 AM - 7:00 PM , Fri 9:00 AM - 5:00 PM  Fees: Free   Phone: (335) 929-6336 Email: contactus@[employer] Website: http://www.Visualant     23  [facility] Distance: 4.84 miles      In-Person   [address] [place] [address]  Language: English  Hours: Mon - Fri 8:00 AM - 3:00 PM  Fees: Free   Phone: (989) 672-2056 Ext.14 Email: neighborhood@[facility] CampusFirst Class EV [facility] Website: http://www.[facility]     Transportation to medical appointments  24  [facility] - Non-Emergency Medical Transportation Distance: 2.99 miles      In-Person   [address] [place] [address]  Language: English  Hours: Mon - Fri 8:00 AM - 4:00 PM Appt. Only  Fees: Self Pay   Phone: (810) 705-5111 Website: http://www.[facility].org/Medical-Services/Emergency-medical-services/Non-emergency-transportation/     25  [employer] Distance: 4.7 miles      In-Person   [address] [address]  Language: English  Hours: Mon - Thu 6:00 AM - 6:00 PM , Fri 6:00 AM - 5:00 PM  Fees: Insurance, Self Pay   Phone: (537) 110-9279 Email: office@[employer] Website: https://www.[employer]/          Important Numbers & Websites       Emergency Services   [address]  [place] [address]  Poison Control   (225) 464-9891  Suicide Prevention Lifeline   (234) 211-7798 (TALK)  Child Abuse Hotline   (992) 588-1742 (4-A-Child)  Sexual  Assault Hotline   (354) 737-6026 (HOPE)  National Runaway Safeline   (377) 627-8092 (RUNAWAY)  All-Options Talkline   (914) 772-3273  Substance Abuse Referral   (304) 129-5047 (HELP)

## 2023-12-27 LAB
ANION GAP SERPL CALCULATED.3IONS-SCNC: 11 MMOL/L (ref 7–15)
BUN SERPL-MCNC: 10.1 MG/DL (ref 6–20)
C TRACH DNA SPEC QL NAA+PROBE: NEGATIVE
CALCIUM SERPL-MCNC: 8.9 MG/DL (ref 8.6–10)
CHLORIDE SERPL-SCNC: 105 MMOL/L (ref 98–107)
CHOLEST SERPL-MCNC: 146 MG/DL
CREAT SERPL-MCNC: 0.66 MG/DL (ref 0.51–0.95)
DEPRECATED HCO3 PLAS-SCNC: 23 MMOL/L (ref 22–29)
EGFRCR SERPLBLD CKD-EPI 2021: >90 ML/MIN/1.73M2
FASTING STATUS PATIENT QL REPORTED: YES
GLUCOSE SERPL-MCNC: 83 MG/DL (ref 70–99)
HBV SURFACE AB SERPL IA-ACNC: <3.5 M[IU]/ML
HBV SURFACE AB SERPL IA-ACNC: NONREACTIVE M[IU]/ML
HCV AB SERPL QL IA: NONREACTIVE
HDLC SERPL-MCNC: 27 MG/DL
HIV 1+2 AB+HIV1 P24 AG SERPL QL IA: NONREACTIVE
LDLC SERPL CALC-MCNC: 95 MG/DL
N GONORRHOEA DNA SPEC QL NAA+PROBE: NEGATIVE
NONHDLC SERPL-MCNC: 119 MG/DL
POTASSIUM SERPL-SCNC: 4.3 MMOL/L (ref 3.4–5.3)
SODIUM SERPL-SCNC: 139 MMOL/L (ref 135–145)
T PALLIDUM AB SER QL: NONREACTIVE
TRIGL SERPL-MCNC: 122 MG/DL

## 2023-12-27 NOTE — PROGRESS NOTES
received Cancer Risk Management Program referral, referred for:    Maternal family history of breast cancer      Reviewed for appropriate plan, and sent to New Patient Scheduling for completion.

## 2023-12-27 NOTE — RESULT ENCOUNTER NOTE
Garrison Goodrich,     Your lab results are slowly coming thought. Your hemoglobin A1C or the lab level that looks at diabetes indicates that you have prediabetes. I added some lifestyle changes you can try to implement that will help decrease your chances of developing diabetes.     Your cholesterol is normal. Your HDL or good cholesterol is low but that will increase with changes in diet.     Hep B, HIV, and Hep C are all negative. Your kidneys and electrolytes are also normal which is great!    I am still waiting on the rest of your STI tests to result and will send you another message once those come back.    Feel free to reach out if you have any questions.     Amanda Yanes

## 2023-12-29 LAB
BKR LAB AP GYN ADEQUACY: ABNORMAL
BKR LAB AP GYN INTERPRETATION: ABNORMAL
BKR LAB AP HPV REFLEX: ABNORMAL
BKR LAB AP LMP: ABNORMAL
BKR LAB AP PREVIOUS ABNORMAL: ABNORMAL
PATH REPORT.COMMENTS IMP SPEC: ABNORMAL
PATH REPORT.COMMENTS IMP SPEC: ABNORMAL
PATH REPORT.RELEVANT HX SPEC: ABNORMAL

## 2024-01-02 ENCOUNTER — PATIENT OUTREACH (OUTPATIENT)
Dept: CARE COORDINATION | Facility: CLINIC | Age: 29
End: 2024-01-02
Payer: COMMERCIAL

## 2024-01-02 LAB
HUMAN PAPILLOMA VIRUS 16 DNA: NEGATIVE
HUMAN PAPILLOMA VIRUS 18 DNA: NEGATIVE
HUMAN PAPILLOMA VIRUS FINAL DIAGNOSIS: ABNORMAL
HUMAN PAPILLOMA VIRUS OTHER HR: POSITIVE

## 2024-01-02 NOTE — PROGRESS NOTES
Contact   Chart Review     Situation: Patient chart reviewed by .    Background: patient established care at [facility].     Assessment: did handoff to IAIN CC at [facility].      Plan/Recommendations: No further outreach by writer.  IAIN CC at [place] will be lead care coordinator.     Mercy Rosenthal,   [facility]  [phone]

## 2024-01-03 ENCOUNTER — PATIENT OUTREACH (OUTPATIENT)
Dept: FAMILY MEDICINE | Facility: CLINIC | Age: 29
End: 2024-01-03
Payer: COMMERCIAL

## 2024-01-03 PROBLEM — R87.610 ASCUS WITH POSITIVE HIGH RISK HPV CERVICAL: Status: ACTIVE | Noted: 2024-01-03

## 2024-01-03 PROBLEM — R87.810 ASCUS WITH POSITIVE HIGH RISK HPV CERVICAL: Status: ACTIVE | Noted: 2024-01-03

## 2024-02-05 ENCOUNTER — PATIENT OUTREACH (OUTPATIENT)
Dept: CARE COORDINATION | Facility: CLINIC | Age: 29
End: 2024-02-05

## 2024-02-05 NOTE — PROGRESS NOTES
[facility]/Voicemail    Clinical Data: Care Coordinator Outreach    Outreach Documentation Number of Outreach Attempt   2/5/2024  11:13 AM 1       Left message on patient's voicemail with call back information and requested return call.    Plan:    Care Coordinator will try to reach patient again in 3-5 business days.    Next outreach due: 2/13/24

## 2024-02-13 ENCOUNTER — PATIENT OUTREACH (OUTPATIENT)
Dept: CARE COORDINATION | Facility: CLINIC | Age: 29
End: 2024-02-13
Payer: COMMERCIAL

## 2024-02-13 NOTE — PROGRESS NOTES
Clinic Care Coordination Contact    Situation: Patient chart reviewed by care coordinator.    Background: Patient is enrolled     Assessment: CHW attempting to reach patient for update     Plan/Recommendations: SW will review in 1-2 weeks     VERNELL Arroyo, MSW   [facility]  Care Coordination  [facility]  [phone]  2/13/2024 1:35 PM

## 2024-02-19 ENCOUNTER — PATIENT OUTREACH (OUTPATIENT)
Dept: CARE COORDINATION | Facility: CLINIC | Age: 29
End: 2024-02-19
Payer: COMMERCIAL

## 2024-02-19 NOTE — PROGRESS NOTES
[facility]/Voicemail    Clinical Data: Care Coordinator Outreach    Outreach Documentation Number of Outreach Attempt   2/5/2024  11:13 AM 1   2/19/2024   3:09 PM 2       Left message on patient's voicemail with call back information and requested return call.    Plan: Care Coordinator will send unable to contact letter with care coordinator contact information via DripDrop. Care Coordinator will try to reach patient again in 10 business days.    Next outreach due: 3/5/24

## 2024-02-19 NOTE — LETTER
[facility]  [address] [facility]  [place] [address]    February 19, 2024    Joleen Morris  [address] [place] [address]      Dear Joleen,    I have been attempting to reach you since our last contact. I would like to continue to work with you and provide any additional support you may need on achieving your health care related goals. I would appreciate if you would give me a call at [phone] to let me know if you would like to continue working together. I know that there are many things that can affect our ability to communicate and I hope we can continue to work together.    All of us at the [facility] are invested in your health and are here to assist you in meeting your goals.     Sincerely,    MELL Jacobo

## 2024-02-21 ENCOUNTER — PATIENT OUTREACH (OUTPATIENT)
Dept: CARE COORDINATION | Facility: CLINIC | Age: 29
End: 2024-02-21
Payer: COMMERCIAL

## 2024-02-21 NOTE — PROGRESS NOTES
Clinic Care Coordination Contact    Situation: Patient chart reviewed by care coordinator.    Background: Patient is enrolled     Assessment: CHW has made 2 unsuccessful calls and sent Unable to Contact letter to patient.  CHW plans to call first week of March     Plan/Recommendations: SW will review in 2 weeks     VERNELL Arroyo, MSW   [facility]  Care Coordination  [facility]  [phone]  2/21/2024 7:44 AM

## 2024-02-21 NOTE — LETTER
[facility] COORD[facility]    [address]  [place] [address]     February 27, 2024        Joleen Burden  [address]  [place] [address]          Dear Joleen,     Brant is an updated Patient Centered Plan of Care for your continued enrollment in Care Coordination. Please let us know if you have additional questions, concerns, or goals that we can assist with.    Sincerely,    Gina Dutton, JUAN JW, MSW Clinic   [facility]  Care Coordination  [place] [facility]   [email or web address]  Office: [phone]  Employed by [facility]  Patient Centered Plan of Care  About Me:        Patient Name:  Joleen Burden    YOB: 1995  Age:         28 year old   [place] MRN:    [id number] Telephone Information:  Home Phone [phone]   Mobile [phone]   Mobile [phone]   Home Phone Not on file.       Address:  [address] [place] [address] Email address:  [email or web address]      Emergency Contact(s)    Name Relationship Lgl Grd Work Phone Home Phone Mobile Phone   1. BRUNA BURDEN Mother No  [phone]    2. PAYTON HUNT Father   [phone]    3. BRUNA BURDEN Mother    [phone]           Primary language:  English     needed? No   [employer]:  [phone] op. 1  Other communication barriers:Glasses    Preferred Method of Communication:  Mail  Current living arrangement: Other    Mobility Status/ Medical Equipment: Independent        Health Maintenance  Health Maintenance Reviewed: Due/Overdue   Health Maintenance Due   Topic Date Due    Pneumococcal Vaccine: Pediatrics (0 to 5 Years) and At-Risk Patients (6 to 64 Years) (1 of 2 - PCV) Never done    HPV IMMUNIZATION (1 - 3-dose series) Never done    HEPATITIS B IMMUNIZATION (1 of 3 - 19+ 3-dose series) Never done    EYE EXAM  02/20/2020    DTAP/TDAP/TD  IMMUNIZATION (1 - Tdap) Never done    INFLUENZA VACCINE (1) Never done    COVID-19 Vaccine (1 - 2023-24 season) Never done    COLPOSCOPY  03/26/2024          My Access Plan  Medical Emergency 911   Primary Clinic Line [facility] - [phone]   24 Hour Appointment Line [phone] or  [phone]) (toll-free)   24 Hour Nurse Line [phone] (toll-free)   Preferred Urgent Care Other     Preferred Hospital [facility]  [phone]     Preferred Pharmacy [facility] [place] [place] [facility] [address]     Behavioral Health Crisis Line The National Suicide Prevention Lifeline at 1-399.559.7212 or Text/Call 478           My Care Team Members  Patient Care Team         Relationship Specialty Notifications Start End    Amanda Evans APRN CNP PCP - General Nurse Practitioner Primary Care All results, Admissions 1/2/24     Phone: [phone] Fax: [phone]         [address] [facility] [address]    Amanda Evans APRN CNP Assigned PCP   12/30/23     Phone: [phone] Fax: [phone] [address] [facility] [address]    Gina Dutton BSW Lead Care Coordinator Primary Care - CC Admissions 1/2/24     Phone: [phone] Fax: [phone]        Odalis Boyce, CHW Community Health Worker Primary Care - CC Admissions 1/24/24     Phone: [phone] Fax: [phone]                    My Care Plans  Self Management and Treatment Plan    Care Plan  Care Plan: Housing Instability       Problem: SDOH LACK OF STABLE HOUSING       Long-Range Goal: Establish Stable Housing       Start Date: 12/12/2023 Expected End Date: 12/11/2024    Priority: High    Note:     Barriers: Past UD and evictions, waiting time for housing support.   Strengths: motivated, employed.   Patient expressed understanding of goal: yes  Action steps to achieve this goal:  1. I will review resources provided by [facility] of housing  options.   2. I will contact those that interest me to see if I can put in an application.   3. I will work with housing support program to get help in cleaning up my rental history or other problems that impede my getting housing.                                 Action Plans on File:            Depression          Advance Care Plans/Directives:   Advanced Care Plan/Directives on file:   No    Discussed with patient/caregiver(s):   Declined Further Information             My Medical and Care Information  Problem List   Patient Active Problem List   Diagnosis    Vision disturbance    Morbid obesity (H)    Abnormal glucose    Acanthosis nigricans    ASCUS with positive high risk HPV cervical          Care Coordination Start Date: 12/5/2023   Frequency of Care Coordination: monthly, more frequently as needed     Form Last Updated: 02/27/2024

## 2024-03-12 ENCOUNTER — PATIENT OUTREACH (OUTPATIENT)
Dept: CARE COORDINATION | Facility: CLINIC | Age: 29
End: 2024-03-12
Payer: COMMERCIAL

## 2024-03-12 NOTE — PROGRESS NOTES
[facility]/Voicemail    Clinical Data: Care Coordinator Outreach    Outreach Documentation Number of Outreach Attempt   2/5/2024  11:13 AM 1   2/19/2024   3:09 PM 2   3/12/2024   3:04 PM 3       Left message on patient's voicemail with call back information and requested return call.    Plan: CHW will request chart review to dis-enroll, unable to reach patient.  Care Coordinator will do no further outreaches at this time.    MELL Castro  [facility], [facility] [place] and [facility]  [phone]

## 2024-03-13 ENCOUNTER — PATIENT OUTREACH (OUTPATIENT)
Dept: CARE COORDINATION | Facility: CLINIC | Age: 29
End: 2024-03-13
Payer: COMMERCIAL

## 2024-03-13 NOTE — LETTER
[facility]  [address]  [place] [address]   March 13, 2024    Joleen Morris  [address] [place] [address]      Dear Joleen,    I have been unsuccessful in reaching you since our last contact. At this time the Care Coordination team will make no further attempts to reach you, however this does not change your ability to continue receiving care from your providers at your primary care clinic. If you need additional support from a care coordinator in the future please contact Gina Dutton at [phone]    All of us at [facility] are invested in your health and are here to assist you in meeting your goals.     Sincerely,    Gina Dutton, JUAN JW, MSW Clinic   [facility]  Care [facility] [place] and [facility]   [email or web address]  Office: [phone]  Employed by [employer]

## 2024-03-13 NOTE — PROGRESS NOTES
Clinic Care Coordination Contact    Situation: Patient chart reviewed by care coordinator.    Background: Patient is enrolled     Assessment: CHW has made 3 unsuccessful attempts to reach patient, is requesting SW review for disenrollment     Plan/Recommendations: SW will close to Care Coordination, will send disenrollment letter via Callystro, will update PCP     VERNELL Arroyo, MSW   [facility]  Care Coordination  [facility]  [phone]  3/13/2024 11:23 AM

## 2024-07-08 NOTE — TELEPHONE ENCOUNTER
FYI to provider - Patient is lost to pap tracking follow-up. Attempts to contact pt have been made per reminder process and there has been no reply and/or no appt scheduled. Contact hx listed below.     12/26/23 ASCUS Pap, +HR HPV (not 16 or 18) Plan [place] due bef 03/26/24  01/3/24 Pt was advised.  2/5/24 [place] appt -- no show  3/13/24 Reminder mychart  4/12/24 [place] not done. Tracking updated for 6 mo colp/pap due 6/26/24.   6/12/24 Reminder mychart -- read  7/8/24 Lost to follow-up for pap tracking     Katie Espinosa RN BSN, Pap Tracking

## 2024-11-26 ENCOUNTER — PATIENT OUTREACH (OUTPATIENT)
Dept: CARE COORDINATION | Facility: CLINIC | Age: 29
End: 2024-11-26
Payer: COMMERCIAL

## 2024-12-10 ENCOUNTER — PATIENT OUTREACH (OUTPATIENT)
Dept: CARE COORDINATION | Facility: CLINIC | Age: 29
End: 2024-12-10
Payer: COMMERCIAL

## 2025-02-08 ENCOUNTER — HEALTH MAINTENANCE LETTER (OUTPATIENT)
Age: 30
End: 2025-02-08

## 2025-02-12 ENCOUNTER — MEDICAL CORRESPONDENCE (OUTPATIENT)
Dept: HEALTH INFORMATION MANAGEMENT | Facility: CLINIC | Age: 30
End: 2025-02-12
Payer: COMMERCIAL